# Patient Record
(demographics unavailable — no encounter records)

---

## 2024-10-14 NOTE — ASSESSMENT
[FreeTextEntry1] : 44 year old here for follow up visit for Type 2 DM, vitamin D deficiency and female infertility here for follow up visit.  1. DM Type 2 A1c 5.1% today (10/11/2024). A1c 5.7% July 2024 A1c 5.6% April 2024 A1c 6.2% January 2024 Plan: Continue Mounjaro 10 mg once weekly for now (dose increased 7/2024, tolerating well). Patient would like to work on further lifestyle changes and increased exercise first. By next visit if no progress on weight loss, then likely increase dose.  Discussed risks of the thyroid C-cell tumor, pancreatitis, hypoglycemia, hypersensitivity reactions, acute kidney injury, severe gastrointestinal complications, discussed that the most common adverse infections included nausea, diarrhea, vomiting, and abdominal pain. We discussed that gastric emptying slow by GLP-1 receptor agonist. Diabetic retinopathy complications have been reported in a cardiovascular outcomes trial. Should have semi-annual eye exam and close follow up with his/her ophthalmologist. Exercise: Discussed the regular exercises are beneficial in type 2 diabetes, independent of weight loss. Encouraged to decrease sedentary time and perform 30 to 60 minutes of moderate intensity aerobic exercise on most days of the week, at least 150 minutes of moderate intensity aerobic exercise per week. Metformin was discontinued in November 2022. Up-to-date with ophthalmologist. Foot exam within normal limits.  2. Vitamin D deficiency S/p treatment course, continue with Vitamin D 8164-5526 IU daily OTC.  3.Obesity, BMI 42.49 kg/mm Discussed the importance of diet and exercise. Continue with Mounjaro 10 mg once weekly for now.  4. Hypertension /68 today Continue to monitor UACR wnl (4/2024). Check albumin/creatinine ratio yearly.  Follow-up in 3 months with Dr. Marie.  Karissa Reddy NP (Brenda)

## 2024-10-14 NOTE — ASSESSMENT
[FreeTextEntry1] : 44 year old here for follow up visit for Type 2 DM, vitamin D deficiency and female infertility here for follow up visit.  1. DM Type 2 A1c 5.1% today (10/11/2024). A1c 5.7% July 2024 A1c 5.6% April 2024 A1c 6.2% January 2024 Plan: Continue Mounjaro 10 mg once weekly for now (dose increased 7/2024, tolerating well). Patient would like to work on further lifestyle changes and increased exercise first. By next visit if no progress on weight loss, then likely increase dose.  Discussed risks of the thyroid C-cell tumor, pancreatitis, hypoglycemia, hypersensitivity reactions, acute kidney injury, severe gastrointestinal complications, discussed that the most common adverse infections included nausea, diarrhea, vomiting, and abdominal pain. We discussed that gastric emptying slow by GLP-1 receptor agonist. Diabetic retinopathy complications have been reported in a cardiovascular outcomes trial. Should have semi-annual eye exam and close follow up with his/her ophthalmologist. Exercise: Discussed the regular exercises are beneficial in type 2 diabetes, independent of weight loss. Encouraged to decrease sedentary time and perform 30 to 60 minutes of moderate intensity aerobic exercise on most days of the week, at least 150 minutes of moderate intensity aerobic exercise per week. Metformin was discontinued in November 2022. Up-to-date with ophthalmologist. Foot exam within normal limits.  2. Vitamin D deficiency S/p treatment course, continue with Vitamin D 1312-3758 IU daily OTC.  3.Obesity, BMI 42.49 kg/mm Discussed the importance of diet and exercise. Continue with Mounjaro 10 mg once weekly for now.  4. Hypertension /68 today Continue to monitor UACR wnl (4/2024). Check albumin/creatinine ratio yearly.  Follow-up in 3 months with Dr. Marie.  Karissa Reddy NP (Brenda)    left side abdomen/complains of pain/discomfort

## 2024-10-14 NOTE — HISTORY OF PRESENT ILLNESS
[FreeTextEntry1] : Ms. MK IVEY is a 44 year old female here to follow up for Type 2 DM.   Initially establish care here in March 2017, in the setting of seeking reproductive endocrinology help with fertility.  Was undergoing a lot of stress with the infertility treatment, patient is currently no longer trying.  She has a 16-year-old stepdaughter who just moved in with her from [place].  She is very happy with her current family situation.  Previous DM Regimen: She is currently taking Metformin 1000 mg extended release twice daily.  She had received nutrition counseling.  She was started on Trulicity 1.5 mg once weekly since May 2022.  She is currently taking 4.5 mg once weekly.  She reports no side effects.  However, she is also not experiencing any further weight loss.  Current DM Regimen: -Mounjaro 10 mg weekly (dose increased 7/2024, tolerating well)  SMBG: OneTouch Verio Reflect fasting- 112, 93, 112, 100, 107, 118, 118,109, 101, 88, 87, 90, 98 post-lunch- 92, 124, 75, 113, 98, 116 Denies hypoglycemia.  She's up to date with eye doctor and has no issue with feet. No  infections.   Diet: breakfast- oatmeal dinner- small portion of rice snacks- sometimes ice cream in the summer drinks- iced coffee with sugar today which is not common (POCT  today)  Exercise: Not much, life has been busy but wants to start doing more.  Social History: daughter just started college  Supplements: sometimes taking vitamin D 1000 IU daily Menses regular.

## 2024-10-14 NOTE — HISTORY OF PRESENT ILLNESS
[FreeTextEntry1] : Ms. MK IVEY is a 44 year old female here to follow up for Type 2 DM.   Initially establish care here in March 2017, in the setting of seeking reproductive endocrinology help with fertility.  Was undergoing a lot of stress with the infertility treatment, patient is currently no longer trying.  She has a 16-year-old stepdaughter who just moved in with her from [place].  She is very happy with her current family situation.  Previous DM Regimen: She is currently taking Metformin 1000 mg extended release twice daily.  She had received nutrition counseling.  She was started on Trulicity 1.5 mg once weekly since May 2022.  She is currently taking 4.5 mg once weekly.  She reports no side effects.  However, she is also not experiencing any further weight loss.  Current DM Regimen: -Mounjaro 10 mg weekly (dose increased 7/2024, tolerating well)  SMBG: OneTouch Verio Reflect fasting- 112, 93, 112, 100, 107, 118, 118,109, 101, 88, 87, 90, 98 post-lunch- 92, 124, 75, 113, 98, 116 Denies hypoglycemia.  She's up to date with eye doctor and has no issue with feet. No  infections.   Diet: breakfast- oatmeal dinner- small portion of rice snacks- sometimes ice cream in the summer drinks- iced coffee with sugar today which is not common (POCT  today)  Exercise: Not much, life has been busy but wants to start doing more.  Social History: daughter just started college  Supplements: sometimes taking vitamin D 1000 IU daily Menses regular.

## 2025-01-27 NOTE — ASSESSMENT
[FreeTextEntry1] : 44 year old here for follow up visit for Type 2 DM, vitamin D deficiency and female infertility here for follow up visit.  1. DM Type 2 A1c 5.6% 01/27/2025  A1c 5.7% July 2024 A1c 5.6% April 2024 A1c 6.2% January 2024  Mounjaro 10mg once weekly to 12.5mg once a week then 15mg once weekly afterwards.   Discussed risks of the thyroid C-cell tumor, pancreatitis, hypoglycemia, hypersensitivity reactions, acute kidney injury, severe gastrointestinal complications, discussed that the most common adverse infections included nausea, diarrhea, vomiting, and abdominal pain. We discussed that gastric emptying slow by GLP-1 receptor agonist. Diabetic retinopathy complications have been reported in a cardiovascular outcomes trial. Should have semi-annual eye exam and close follow up with his/her ophthalmologist. Exercise: Discussed the regular exercises are beneficial in type 2 diabetes, independent of weight loss.Encouraged to decrease sedentary time and perform 30 to 60 minutes of moderate intensity aerobic exercise on most days of the week, at least 150 minutes of moderate intensity aerobic exercise per week. Metformin was discontinued in November 2022. Up-to-date with ophthalmologist. Foot exam normal in 2024.   2. Vitamin D deficiency S/p treatment course, continue with Vitamin D 4843-8789 IU daily OTC.  Remind patient to take meds every day   3.Obesity, BMI 42.49 kg/mm Discussed the importance of diet and exercise. Continue with Mounjaro 12.5 mg once weekly for 4 weeks then increase to 15mg once weekly   4. Hypertension 01/27/2025 128/64 110/70 07/02/2024  Continue to monitor Check albumin/creatinine ratio yearly, last check albumin    ACP follow-up in 3 months Follow-up with me in 6 months.  fasting blood work done.

## 2025-04-27 NOTE — ASSESSMENT
[FreeTextEntry1] : 44 year old here for follow up visit for Type 2 DM, vitamin D deficiency and female infertility here for follow up visit.  1. DM Type 2 A1c 5.3% (4/25/2025) A1c 5.6% (1/27/2025) A1c 5.1% (10/11/2024). A1c 5.7% July 2024 A1c 5.6% April 2024 A1c 6.2% January 2024 Plan: Continue Mounjaro 12.5 mg once weekly for now (dose increased 1/2025, tolerating well).  She does not want to increase dose yet, wants to wait until next visit to assess progress of weight loss.  Patient would like to work on further lifestyle changes, I highly encouraged increased exercise first (states she will speak to her nephew who has expertise on exercise).  Discussed risks of the thyroid C-cell tumor, pancreatitis, hypoglycemia, hypersensitivity reactions, acute kidney injury, severe gastrointestinal complications, discussed that the most common adverse infections included nausea, diarrhea, vomiting, and abdominal pain. We discussed that gastric emptying slow by GLP-1 receptor agonist. Diabetic retinopathy complications have been reported in a cardiovascular outcomes trial. Should have semi-annual eye exam and close follow up with his/her ophthalmologist. Exercise: Discussed the regular exercises are beneficial in type 2 diabetes, independent of weight loss. Encouraged to decrease sedentary time and perform 30 to 60 minutes of moderate intensity aerobic exercise on most days of the week, at least 150 minutes of moderate intensity aerobic exercise per week. Metformin was discontinued in November 2022. Up-to-date with ophthalmologist. Foot exam within normal limits.  2. Vitamin D deficiency S/p treatment course, continue with Vitamin D 4723-5418 IU daily OTC.  3.Obesity, BMI 42.49 kg/mm Discussed the importance of diet and exercise. Continue with Mounjaro 12.5 mg once weekly for now.  4. Hypertension /84 today Continue to monitor UACR wnl (2/17/2025). Check albumin/creatinine ratio yearly.  Follow-up in 3-4 months with Dr. Marie.  Karissa Reddy NP (Brenda)

## 2025-04-27 NOTE — ASSESSMENT
[FreeTextEntry1] : 44 year old here for follow up visit for Type 2 DM, vitamin D deficiency and female infertility here for follow up visit.  1. DM Type 2 A1c 5.3% (4/25/2025) A1c 5.6% (1/27/2025) A1c 5.1% (10/11/2024). A1c 5.7% July 2024 A1c 5.6% April 2024 A1c 6.2% January 2024 Plan: Continue Mounjaro 12.5 mg once weekly for now (dose increased 1/2025, tolerating well).  She does not want to increase dose yet, wants to wait until next visit to assess progress of weight loss.  Patient would like to work on further lifestyle changes, I highly encouraged increased exercise first (states she will speak to her nephew who has expertise on exercise).  Discussed risks of the thyroid C-cell tumor, pancreatitis, hypoglycemia, hypersensitivity reactions, acute kidney injury, severe gastrointestinal complications, discussed that the most common adverse infections included nausea, diarrhea, vomiting, and abdominal pain. We discussed that gastric emptying slow by GLP-1 receptor agonist. Diabetic retinopathy complications have been reported in a cardiovascular outcomes trial. Should have semi-annual eye exam and close follow up with his/her ophthalmologist. Exercise: Discussed the regular exercises are beneficial in type 2 diabetes, independent of weight loss. Encouraged to decrease sedentary time and perform 30 to 60 minutes of moderate intensity aerobic exercise on most days of the week, at least 150 minutes of moderate intensity aerobic exercise per week. Metformin was discontinued in November 2022. Up-to-date with ophthalmologist. Foot exam within normal limits.  2. Vitamin D deficiency S/p treatment course, continue with Vitamin D 1396-5035 IU daily OTC.  3.Obesity, BMI 42.49 kg/mm Discussed the importance of diet and exercise. Continue with Mounjaro 12.5 mg once weekly for now.  4. Hypertension /84 today Continue to monitor UACR wnl (2/17/2025). Check albumin/creatinine ratio yearly.  Follow-up in 3-4 months with Dr. Marie.  Karissa Reddy NP (Brenda)

## 2025-06-25 NOTE — DATA REVIEWED
[de-identified] : An audiogram was ordered and performed including pure tones, tympanometry and speech testing for the patients complaint of hearing loss I have independently reviewed the patient's audiogram from today and my findings include  AU Hearing -8k hz. AU Tymp A

## 2025-06-25 NOTE — HISTORY OF PRESENT ILLNESS
[de-identified] : 44 year F presents with episodes of dizziness. Denies hearing loss, tinnitus, otalgia, ear fullness Patient describes dizzy as rocking, spinning, swaying, bobbing Started after bending forward to pick something up discrete episode lasted for couple of minutes Since initial episode has not had another one since History of headaches/migraines Denies family history of ear surgeries

## 2025-06-25 NOTE — ASSESSMENT
[FreeTextEntry1] : 44 year F presents with Dizziness   No patterns of low frequency or asymmetrical SNHL on audiogram. [place] Dior-pike negative. Fukuda Step Test Negative. Negative Romberg. Patient denies acute history of recent viral infection or history of headaches or migraines. Denies fullness in the ear or fluctuations in hearing during episodes   Personally reviewed audiogram shows AU Hearing -8k hz. AU Tymp A   Based on clinical history and physical exam suspicious for BPPV which is now resolved with poor compensation due migraines   Recommend Dizziness - on keeping meclizine usage to as needed -[place] Exercise Handout Given -Instructed Patient to follow up with Primary Care to make sure routine blood work is completed for medical etiologies for dizziness like anemia/orthostatic hypotension. -Instructed patient should follow up with cardiology to rule out cardiac etiology and neurology for central etiology of dizziness -Migraine Handout Given -Patient defer Vestibular Therapy given symptoms have resolved   Cerumen Impaction -Discussed not using q-tips or instruments to remove wax -Discussed that the ear is a self cleaning structure and just allow it clean itself. If wax builds up can try debrox. Once it gets impacted recommend return to get it cleaned out.   -Return to clinic as needed or sooner if new/worsen symptoms present

## 2025-06-25 NOTE — PHYSICAL EXAM
[Normal] : mucosa is normal [Midline] : trachea located in midline position [Nystagmus] : ~T no ~M nystagmus was seen [Fukuda Step Test] : Fukuda Step Test was Negative [Romberg's Sign] : Romberg's sign was absent [[place]] : [place]-Hallpike: Negative [FreeTextEntry8] : cerumen impaction. removed [FreeTextEntry9] : cerumen impaction. removed